# Patient Record
Sex: FEMALE | Race: WHITE
[De-identification: names, ages, dates, MRNs, and addresses within clinical notes are randomized per-mention and may not be internally consistent; named-entity substitution may affect disease eponyms.]

---

## 2019-12-02 ENCOUNTER — HOSPITAL ENCOUNTER (EMERGENCY)
Dept: HOSPITAL 59 - ER | Age: 29
Discharge: HOME | End: 2019-12-02
Payer: COMMERCIAL

## 2019-12-02 DIAGNOSIS — B34.9: Primary | ICD-10-CM

## 2019-12-02 DIAGNOSIS — R05: ICD-10-CM

## 2019-12-02 DIAGNOSIS — R07.89: ICD-10-CM

## 2019-12-02 DIAGNOSIS — R53.1: ICD-10-CM

## 2019-12-02 DIAGNOSIS — R06.00: ICD-10-CM

## 2019-12-02 LAB
ABSOLUTE NEUTROPHIL COUNT: 4.66
ANION GAP SERPL CALC-SCNC: 13 MMOL/L (ref 7–16)
APPEARANCE UR: (no result)
BACTERIA #/AREA URNS HPF: (no result) /[HPF]
BASOPHILS NFR BLD: 0.1 % (ref 0–6)
BILIRUB UR-MCNC: NEGATIVE MG/DL
BUN SERPL-MCNC: 10 MG/DL (ref 6–20)
CO2 SERPL-SCNC: 23 MMOL/L (ref 22–29)
COLOR UR: YELLOW
CREAT SERPL-MCNC: 0.5 MG/DL (ref 0.5–0.9)
EOSINOPHIL NFR BLD: 0.6 % (ref 0–6)
ERYTHROCYTE [DISTWIDTH] IN BLOOD BY AUTOMATED COUNT: 13.1 % (ref 11.5–14.5)
ERYTHROCYTE [SEDIMENTATION RATE] IN BLOOD: 4 MM/HR (ref 0–20)
EST GLOMERULAR FILTRATION RATE: > 60 ML/MIN
GLUCOSE SERPL-MCNC: 85 MG/DL (ref 74–109)
GLUCOSE UR STRIP-MCNC: NEGATIVE MG/DL
GRANULOCYTES NFR BLD: 67.4 % (ref 47–80)
HCG,QUALITATIVE URINE: NEGATIVE
HCT VFR BLD CALC: 41.2 % (ref 35–47)
HGB BLD-MCNC: 13.6 GM/DL (ref 11.6–16)
KETONES UR QL STRIP: NEGATIVE
LYMPHOCYTES NFR BLD AUTO: 23.9 % (ref 16–45)
MCH RBC QN AUTO: 29 PG (ref 27–33)
MCHC RBC AUTO-ENTMCNC: 33 G/DL (ref 32–36)
MCV RBC AUTO: 87.8 FL (ref 81–97)
MONOCYTES NFR BLD: 8 % (ref 0–9)
NITRITE UR QL STRIP: NEGATIVE
PLATELET # BLD: 300 K/UL (ref 130–400)
PMV BLD AUTO: 9.3 FL (ref 7.4–10.4)
PROT UR QL STRIP: NEGATIVE
RBC # BLD AUTO: 4.69 M/UL (ref 3.8–5.4)
RBC # UR STRIP: NEGATIVE /UL
RBC #/AREA URNS HPF: (no result) /[HPF]
URINE LEUKOCYTE ESTERASE: (no result)
UROBILINOGEN UR STRIP-ACNC: 0.2 E.U./DL (ref 0.2–1)
WBC # BLD AUTO: 6.9 K/UL (ref 4.2–12.2)
WBC #/AREA URNS HPF: (no result) /[HPF]

## 2019-12-02 PROCEDURE — 81025 URINE PREGNANCY TEST: CPT

## 2019-12-02 PROCEDURE — 71046 X-RAY EXAM CHEST 2 VIEWS: CPT

## 2019-12-02 PROCEDURE — 85025 COMPLETE CBC W/AUTO DIFF WBC: CPT

## 2019-12-02 PROCEDURE — 81001 URINALYSIS AUTO W/SCOPE: CPT

## 2019-12-02 PROCEDURE — 99284 EMERGENCY DEPT VISIT MOD MDM: CPT

## 2019-12-02 PROCEDURE — 85651 RBC SED RATE NONAUTOMATED: CPT

## 2019-12-02 PROCEDURE — 80048 BASIC METABOLIC PNL TOTAL CA: CPT

## 2019-12-02 PROCEDURE — 85379 FIBRIN DEGRADATION QUANT: CPT

## 2019-12-02 NOTE — RADIOLOGY REPORT
EXAMINATION: Two View Chest Radiographs

EXAM DATE:  12/2/2019 8:41 PM



TECHNIQUE:  Frontal and lateral views



INDICATION:  cough

COMPARISON:  None



ENCOUNTER: Not applicable

_________________________



FINDINGS:



Implantable loop recorder device noted. Normal heart size. No vascular congestion. Clear lungs. No pn
eumothorax or pleural effusion.

_________________________



IMPRESSION:



No acute cardiopulmonary disease is present.



Dictated by: Samuel Bolton MD on 12/2/2019 9:12 PM.

Electronically signed by: Samuel Bolton MD on 12/2/2019 9:14 PM.

## 2019-12-02 NOTE — EMERGENCY DEPARTMENT RECORD
History of Present Illness





- General


Chief complaint: Pain


Stated complaint: PAIN WHEN COUGHING, VOMITING,


Time Seen by Provider: 19 18:36


Source: Patient


Mode of Arrival: Ambulatory


Limitations: No limitations





- History of Present Illness


Initial comments: 





pt states she thinks she got her pneumonia back.  she stated she just finished a

zpack last week.  she states she has a productive cough that hurts when she 

coughs.  her family dr told her to come in. she was told that she might have tb 

because she has a cousin that might have it. ['but she lies alot'].  she does 

not live with the cousin.  she feels weak


MD Complaint: Other


Onset/Timin


-: Week(s)


History of Same: No


-: Yes Associated dyspnea, Yes Associated chest pain


Radiation: None


Severity scale (1-10): 7


Quality: Aching


Consistency: Intermittent


Improves with: Nothing


Worsens with: Other


Associated Symptoms: Denies other symptoms, Chest pain, Shortness of breath





- Related Data


                                Home Medications











 Medication  Instructions  Recorded  Confirmed  Last Taken


 


Clonazepam [Klonopin] 1 mg PO QHS 19 Unknown


 


Levothyroxine Sodium [Synthroid] 112 mcg PO DAILY 19 Unknown


 


Trazodone HCl 50 mg PO QHS 19 Unknown


 


Venlafaxine HCl [Effexor] 150 mg PO DAILY 19 Unknown








                                  Previous Rx's











 Medication  Instructions  Recorded


 


Benzonatate [Tessalon] 1 cap PO Q8H PRN #10 cap 19











                                    Allergies











Allergy/AdvReac Type Severity Reaction Status Date / Time


 


No Known Drug Allergies Allergy   Verified 19 18:37














Travel Screening





- Travel/Exposure Within Last 30 Days


Have you traveled within the last 30 days?: No





Review of Systems


Reviewed: No additional complaints except as noted below


Constitutional: Reports: As per HPI.  Denies: Chills, Fever, Malaise, Night 

sweats, Weakness, Weight change


Eyes: Reports: As per HPI.  Denies: Eye discharge, Eye pain, Photophobia, Vision

 change


ENT: Reports: As per HPI.  Denies: Congestion, Dental pain, Ear pain, Epistaxis,

 Hearing loss, Throat pain


Respiratory: Reports: As per HPI, Cough, Dyspnea.  Denies: Hemoptysis, Stridor, 

Wheezes


Cardiovascular: Reports: As per HPI, Chest pain.  Denies: Arrhythmia, Dyspnea on

 exertion, Edema, Murmurs, Orthopnea, Palpitations, Paroxysmal nocturnal 

dyspnea, Rheumatic Fever, Syncope


Endocrine: Reports: As per HPI.  Denies: Fatigue, Heat or cold intolerance, 

Polydipsia, Polyuria


Gastrointestinal: Reports: As per HPI.  Denies: Abdominal pain, Constipation, 

Diarrhea, Hematemesis, Hematochezia, Melena, Nausea, Vomiting


Genitourinary: Reports: As per HPI.  Denies: Abnormal menses, Discharge, 

Dyspareunia, Dysuria, Frequency, Hematuria, Incontinence, Retention, Urgency


Musculoskeletal: Reports: As per HPI.  Denies: Arthralgia, Back pain, Gout, 

Joint swelling, Myalgia, Neck pain


Skin: Reports: As per HPI.  Denies: Bruising, Change in color, Change in 

hair/nails, Lesions, Pruritus, Rash


Neurological: Reports: As per HPI.  Denies: Abnormal gait, Confusion, Headache, 

Numbness, Paresthesias, Seizure, Tingling, Tremors, Vertigo, Weakness


Psychiatric: Reports: As per HPI.  Denies: Anxiety, Auditory hallucinations, 

Depression, Homicidal thoughts, Suicidal thoughts, Visual hallucinations


Hematological/Lymphatic: Reports: As per HPI.  Denies: Anemia, Blood Clots, Easy

 bleeding, Easy bruising, Swollen glands





Past Medical History





- SOCIAL HISTORY


Smoking Status: Never smoker


Alcohol Use: None


Drug Use: None





- RESPIRATORY


Hx Respiratory Disorders: No





- CARDIOVASCULAR


Hx Cardio Disorders: Yes


Hx Irregular Heartbeat: Yes (SVT)


Comment:: SVT





- NEURO


Hx Neuro Disorders: No





- GI


Hx GI Disorders: No





- 


Hx Genitourinary Disorders: Yes


Hx Kidney Stones: Yes





- ENDOCRINE


Hx Endocrine Disorders: Yes


Hx Thyroid Disease: Yes





- MUSCULOSKELETAL


Hx Musculoskeletal Disorders: No





- PSYCH


Hx Psych Problems: Yes


Hx Anxiety: Yes


Hx Depression: Yes





- HEMATOLOGY/ONCOLOGY


Hx Hematology/Oncology Disorders: No





Family Medical History


Any Significant Family History?: No





Physical Exam





- General


General Appearance: Alert, Oriented x3, Cooperative, Mild distress





- Head


Head exam: Normal inspection





- Eye


Eye exam: Normal appearance, PERRL, EOMI


Pupils: Normal accommodation





- ENT


ENT exam: Normal exam, Mucous membranes moist, Normal external ear exam, Normal 

orophraynx, TM's normal bilaterally


Ear exam: Normal external inspection.  negative: External canal tenderness


Nasal Exam: Normal inspection.  negative: Discharge, Sinus tenderness


Mouth exam: Normal external inspection, Tongue normal


Teeth exam: Normal inspection.  negative: Dental caries


Throat exam: Normal inspection.  negative: Tonsillar erythema, Tonsillar exudate





- Neck


Neck exam: Normal inspection, Full ROM.  negative: Tenderness





- Respiratory


Respiratory exam: Normal lung sounds bilaterally.  negative: Respiratory 

distress





- Cardiovascular


Cardiovascular Exam: Regular rate, Normal rhythm, Normal heart sounds





- GI/Abdominal


GI/Abdominal exam: Soft, Normal bowel sounds.  negative: Tenderness





- Rectal


Rectal exam: Deferred





- 


 exam: Deferred





- Extremities


Extremities exam: Normal inspection, Full ROM, Normal capillary refill.  

negative: Tenderness





- Back


Back exam: Reports: Normal inspection, Full ROM.  Denies: Muscle spasm, Rash 

noted, Tenderness





- Neurological


Neurological exam: Alert, CN II-XII intact, Normal gait, Oriented X3





- Psychiatric


Psychiatric exam: Normal affect, Normal mood





- Skin


Skin exam: Dry, Intact, Normal color, Warm





Course





                                   Vital Signs











  19





  18:22 18:29


 


Temperature 98.5 F 98.5 F


 


Pulse Rate  96 H


 


Pulse Rate [ 94 H 





Pulse Ox Probe]  


 


Respiratory 18 18





Rate  


 


Blood Pressure  123/83


 


Blood Pressure 123/83 





[Left Arm]  


 


Pulse Ox 95 94 L














Medical Decision Making





- Lab Data


Result diagrams: 


                                 19 19:10





                                 19 19:10





Disposition


Disposition: Discharge


Clinical Impression: 


 Viral syndrome





Disposition: Home, Self-Care


Condition: (1) Good


Instructions:  Viral Syndrome (ED)


Additional Instructions: 


follow up with family doctor.  return sooner if worse.  push fluids.  tylenol 

and motrin as needed


Prescriptions: 


Benzonatate [Tessalon] 1 cap PO Q8H PRN #10 cap


 PRN Reason: Cough


Forms:  Patient Portal Access





Quality





- Quality Measures


Quality Measures: N/A





- Blood Pressure Screening


Does Patient Have Any of the Following: No


Blood Pressure Classification: Pre-Hypertensive BP Reading


Systolic Measurement: 123


Diastolic Measurement: 83


Screening for High Blood Pressure: < Pre-Hypertensive BP, F/U Documented > 

[]


Pre-Hypertensive Follow-up Interventions: Follow-up with rescreen every year.